# Patient Record
Sex: MALE | Race: WHITE | NOT HISPANIC OR LATINO
[De-identification: names, ages, dates, MRNs, and addresses within clinical notes are randomized per-mention and may not be internally consistent; named-entity substitution may affect disease eponyms.]

---

## 2018-11-29 PROBLEM — Z00.00 ENCOUNTER FOR PREVENTIVE HEALTH EXAMINATION: Status: ACTIVE | Noted: 2018-11-29

## 2018-12-11 ENCOUNTER — APPOINTMENT (OUTPATIENT)
Dept: CARDIOLOGY | Facility: CLINIC | Age: 58
End: 2018-12-11
Payer: COMMERCIAL

## 2018-12-11 PROCEDURE — 93880 EXTRACRANIAL BILAT STUDY: CPT

## 2018-12-11 PROCEDURE — 93306 TTE W/DOPPLER COMPLETE: CPT

## 2018-12-12 ENCOUNTER — APPOINTMENT (OUTPATIENT)
Dept: CARDIOLOGY | Facility: CLINIC | Age: 58
End: 2018-12-12
Payer: COMMERCIAL

## 2018-12-12 ENCOUNTER — NON-APPOINTMENT (OUTPATIENT)
Age: 58
End: 2018-12-12

## 2018-12-12 VITALS
SYSTOLIC BLOOD PRESSURE: 174 MMHG | WEIGHT: 185 LBS | HEART RATE: 101 BPM | BODY MASS INDEX: 26.48 KG/M2 | DIASTOLIC BLOOD PRESSURE: 80 MMHG | OXYGEN SATURATION: 98 % | HEIGHT: 70 IN

## 2018-12-12 DIAGNOSIS — Z78.9 OTHER SPECIFIED HEALTH STATUS: ICD-10-CM

## 2018-12-12 DIAGNOSIS — Z82.49 FAMILY HISTORY OF ISCHEMIC HEART DISEASE AND OTHER DISEASES OF THE CIRCULATORY SYSTEM: ICD-10-CM

## 2018-12-12 DIAGNOSIS — Z87.891 PERSONAL HISTORY OF NICOTINE DEPENDENCE: ICD-10-CM

## 2018-12-12 DIAGNOSIS — Z83.3 FAMILY HISTORY OF DIABETES MELLITUS: ICD-10-CM

## 2018-12-12 PROCEDURE — 93000 ELECTROCARDIOGRAM COMPLETE: CPT

## 2018-12-12 PROCEDURE — 99204 OFFICE O/P NEW MOD 45 MIN: CPT

## 2018-12-12 NOTE — PHYSICAL EXAM
[General Appearance - Well Developed] : well developed [Normal Appearance] : normal appearance [Well Groomed] : well groomed [General Appearance - Well Nourished] : well nourished [No Deformities] : no deformities [General Appearance - In No Acute Distress] : no acute distress [Normal Conjunctiva] : the conjunctiva exhibited no abnormalities [Eyelids - No Xanthelasma] : the eyelids demonstrated no xanthelasmas [Normal Oral Mucosa] : normal oral mucosa [No Oral Pallor] : no oral pallor [No Oral Cyanosis] : no oral cyanosis [Normal Jugular Venous A Waves Present] : normal jugular venous A waves present [Normal Jugular Venous V Waves Present] : normal jugular venous V waves present [No Jugular Venous Oates A Waves] : no jugular venous oates A waves [Respiration, Rhythm And Depth] : normal respiratory rhythm and effort [Exaggerated Use Of Accessory Muscles For Inspiration] : no accessory muscle use [Auscultation Breath Sounds / Voice Sounds] : lungs were clear to auscultation bilaterally [Heart Rate And Rhythm] : heart rate and rhythm were normal [Heart Sounds] : normal S1 and S2 [Murmurs] : no murmurs present [Abdomen Soft] : soft [Abdomen Tenderness] : non-tender [Abdomen Mass (___ Cm)] : no abdominal mass palpated [Abnormal Walk] : normal gait [Gait - Sufficient For Exercise Testing] : the gait was sufficient for exercise testing [Nail Clubbing] : no clubbing of the fingernails [Cyanosis, Localized] : no localized cyanosis [Petechial Hemorrhages (___cm)] : no petechial hemorrhages [Skin Color & Pigmentation] : normal skin color and pigmentation [] : no rash [No Venous Stasis] : no venous stasis [Skin Lesions] : no skin lesions [No Skin Ulcers] : no skin ulcer [No Xanthoma] : no  xanthoma was observed

## 2018-12-12 NOTE — HISTORY OF PRESENT ILLNESS
[FreeTextEntry1] : The patient is presenting here today for cardiac evaluation. The patient is 58-year-old gentleman with history of hypertension. Patient has a component of white coat syndrome. He gets very anxious coming to doctors office. He checks his blood pressure at home and it's usually well controlled. Patient was referred for echocardiogram and carotid ultrasound. Patient was found to have likely ASD on echocardiogram. Patient has a history of cardiac murmur. Patient denies any chest pains. Patient has mild dyspnea on exertion.

## 2018-12-12 NOTE — ASSESSMENT
[FreeTextEntry1] : Today's ECG was reviewed. It is abnormal with LVH and secondary ST and T-wave changes echocardiogram was reviewed. No evidence of LVH. There is evidence of left to right shunt . An esophageal echocardiogram will be done to evaluate this of ASD.\par Dyspnea on exertion. Hypertension. Since this will be done to rule out any obstructive CAD. Followup after further testing.

## 2018-12-15 ENCOUNTER — TRANSCRIPTION ENCOUNTER (OUTPATIENT)
Age: 58
End: 2018-12-15

## 2019-02-06 ENCOUNTER — OUTPATIENT (OUTPATIENT)
Dept: OUTPATIENT SERVICES | Facility: HOSPITAL | Age: 59
LOS: 1 days | End: 2019-02-06
Payer: COMMERCIAL

## 2019-02-06 PROCEDURE — 93320 DOPPLER ECHO COMPLETE: CPT | Mod: 26

## 2019-02-06 PROCEDURE — 93312 ECHO TRANSESOPHAGEAL: CPT | Mod: 26

## 2019-02-12 ENCOUNTER — APPOINTMENT (OUTPATIENT)
Dept: CARDIOLOGY | Facility: CLINIC | Age: 59
End: 2019-02-12

## 2019-02-13 ENCOUNTER — APPOINTMENT (OUTPATIENT)
Dept: CARDIOLOGY | Facility: CLINIC | Age: 59
End: 2019-02-13

## 2019-02-15 ENCOUNTER — APPOINTMENT (OUTPATIENT)
Dept: CARDIOLOGY | Facility: CLINIC | Age: 59
End: 2019-02-15
Payer: COMMERCIAL

## 2019-02-15 PROCEDURE — 99215 OFFICE O/P EST HI 40 MIN: CPT

## 2019-02-17 NOTE — ASSESSMENT
[FreeTextEntry1] : Atrial septal defect. Right-sided enlargement. \par Refer for percutaneous closure. \par Continue antiplatelet therapy. \par \par Mitral valve disease. \par Does not require SBE prophylaxis. \par \par Hypertension. Reactive component. \par Monitor blood pressure at home. \par Return with blood pressure monitor and log. \par \par Hyperlipdiemia\par pre DM\par Continue statin therapy. \par \par Clinical followup will be scheduled in one month.\par

## 2019-02-17 NOTE — HISTORY OF PRESENT ILLNESS
[FreeTextEntry1] : ZACK MICHAELS  is a 59 year old  M\par h/o ASD, HTN, HL, pre DM, MR\par \par Initially referred by Dr. Dixon for an echocardiogram after recently diagnosed with hypertension, hyperlipidemia. \par Started on antihypertensives and statin therapy.\par Echocardiogram demonstrated right-sided enlargement. \par He reports he was previously told of an enlarged heart prior to his hip surgery. \par \par There is no prior history of a clinical myocardial infarction, coronary revascularization. \par There is no history of symptomatic congestive heart failure rheumatic heart disease\par There is no history of symptomatic arrhythmias including atrial fibrillation.\par \par Physically active at baseline. Previously, running 5K. \par Less active after hip replacement. \par Walks actively at his job. \par There is no exertional chest pain, pressure or discomfort. \par There is no significant dyspnea on exertion or orthopnea. \par There are no symptomatic palpitations, lightheadedness, dizziness or syncope.\par \par November 2018. Hemoglobin 16.0, creatinine 1.0, potassium 4.2, total cholesterol 235, , hemoglobin A1c 5.7, glucose 139, TSH 1.3\par \par EKG demonstrates normal sinus rhythm with possible LVH. \par \par DIMAS demonstrated dilated right-sided chambers with the atrial septal defect and mitral valve disease. \par \par Scheduled to see Dr. Guzman next week

## 2019-02-17 NOTE — REASON FOR VISIT
[Hypertension] : hypertension [Follow-Up - Clinic] : a clinic follow-up of [Abnormal Test Result] : an abnormal test result

## 2019-02-19 ENCOUNTER — APPOINTMENT (OUTPATIENT)
Dept: CARDIOLOGY | Facility: CLINIC | Age: 59
End: 2019-02-19
Payer: COMMERCIAL

## 2019-02-19 VITALS
WEIGHT: 185 LBS | SYSTOLIC BLOOD PRESSURE: 160 MMHG | HEIGHT: 70 IN | DIASTOLIC BLOOD PRESSURE: 82 MMHG | OXYGEN SATURATION: 100 % | HEART RATE: 72 BPM | BODY MASS INDEX: 26.48 KG/M2

## 2019-02-19 PROCEDURE — 99205 OFFICE O/P NEW HI 60 MIN: CPT

## 2019-02-19 RX ORDER — LOSARTAN POTASSIUM AND HYDROCHLOROTHIAZIDE 50; 12.5 MG/1; MG/1
50-12.5 TABLET, FILM COATED ORAL DAILY
Refills: 0 | Status: DISCONTINUED | COMMUNITY
End: 2019-02-19

## 2019-02-19 NOTE — PHYSICAL EXAM
[General Appearance - Well Developed] : well developed [Normal Appearance] : normal appearance [Well Groomed] : well groomed [General Appearance - Well Nourished] : well nourished [No Deformities] : no deformities [General Appearance - In No Acute Distress] : no acute distress [Normal Conjunctiva] : the conjunctiva exhibited no abnormalities [Eyelids - No Xanthelasma] : the eyelids demonstrated no xanthelasmas [Normal Oral Mucosa] : normal oral mucosa [No Oral Pallor] : no oral pallor [No Oral Cyanosis] : no oral cyanosis [Heart Rate And Rhythm] : heart rate and rhythm were normal [Heart Sounds] : normal S1 and S2 [Systolic grade ___/6] : A grade [unfilled]/6 systolic murmur was heard. [Respiration, Rhythm And Depth] : normal respiratory rhythm and effort [Exaggerated Use Of Accessory Muscles For Inspiration] : no accessory muscle use [Auscultation Breath Sounds / Voice Sounds] : lungs were clear to auscultation bilaterally [Abdomen Soft] : soft [Abdomen Tenderness] : non-tender [Abdomen Mass (___ Cm)] : no abdominal mass palpated [Abnormal Walk] : normal gait [Gait - Sufficient For Exercise Testing] : the gait was sufficient for exercise testing [Nail Clubbing] : no clubbing of the fingernails [Cyanosis, Localized] : no localized cyanosis [Petechial Hemorrhages (___cm)] : no petechial hemorrhages [Skin Color & Pigmentation] : normal skin color and pigmentation [] : no rash [No Venous Stasis] : no venous stasis [Skin Lesions] : no skin lesions [No Skin Ulcers] : no skin ulcer [No Xanthoma] : no  xanthoma was observed

## 2019-02-26 NOTE — REASON FOR VISIT
[Consultation] : a consultation regarding [FreeTextEntry2] : evaluation for ASD closure [FreeTextEntry1] : enlarged heart, atrial septal defect

## 2019-02-26 NOTE — DISCUSSION/SUMMARY
[FreeTextEntry1] : DIMAS was reviewed.  This shows a moderate sized ostium secundum ASD with left to right shunting.  RV is enlarged.  There are adequate margins for percutaneous closure.  With RV enlargement, closure of ASD is indicated.  Procedure was explained to patient who wishes to proceed.

## 2019-02-26 NOTE — HISTORY OF PRESENT ILLNESS
[FreeTextEntry1] : 59 year old asymptomatic male who was previously told of an enlarged heart.  Recent echo followed by DIMAS found an ostium secundum ASD with RV enlargement.  Patient denies chest pain, dyspnea, palpitations.

## 2019-03-12 ENCOUNTER — OUTPATIENT (OUTPATIENT)
Dept: OUTPATIENT SERVICES | Facility: HOSPITAL | Age: 59
LOS: 1 days | End: 2019-03-12
Payer: COMMERCIAL

## 2019-03-12 VITALS
TEMPERATURE: 98 F | HEART RATE: 82 BPM | RESPIRATION RATE: 18 BRPM | SYSTOLIC BLOOD PRESSURE: 145 MMHG | OXYGEN SATURATION: 97 % | WEIGHT: 190.04 LBS | DIASTOLIC BLOOD PRESSURE: 78 MMHG | HEIGHT: 70 IN

## 2019-03-12 VITALS — HEIGHT: 70 IN | WEIGHT: 190.04 LBS

## 2019-03-12 DIAGNOSIS — Z01.810 ENCOUNTER FOR PREPROCEDURAL CARDIOVASCULAR EXAMINATION: ICD-10-CM

## 2019-03-12 DIAGNOSIS — Z96.641 PRESENCE OF RIGHT ARTIFICIAL HIP JOINT: Chronic | ICD-10-CM

## 2019-03-12 LAB
ANION GAP SERPL CALC-SCNC: 12 MMOL/L — SIGNIFICANT CHANGE UP (ref 5–17)
APTT BLD: 33 SEC — SIGNIFICANT CHANGE UP (ref 27.5–36.3)
BASOPHILS # BLD AUTO: 0 K/UL — SIGNIFICANT CHANGE UP (ref 0–0.2)
BASOPHILS NFR BLD AUTO: 0.5 % — SIGNIFICANT CHANGE UP (ref 0–2)
BLD GP AB SCN SERPL QL: SIGNIFICANT CHANGE UP
BUN SERPL-MCNC: 22 MG/DL — HIGH (ref 8–20)
CALCIUM SERPL-MCNC: 9.1 MG/DL — SIGNIFICANT CHANGE UP (ref 8.6–10.2)
CHLORIDE SERPL-SCNC: 102 MMOL/L — SIGNIFICANT CHANGE UP (ref 98–107)
CO2 SERPL-SCNC: 26 MMOL/L — SIGNIFICANT CHANGE UP (ref 22–29)
CREAT SERPL-MCNC: 1.01 MG/DL — SIGNIFICANT CHANGE UP (ref 0.5–1.3)
EOSINOPHIL # BLD AUTO: 0.1 K/UL — SIGNIFICANT CHANGE UP (ref 0–0.5)
EOSINOPHIL NFR BLD AUTO: 2.1 % — SIGNIFICANT CHANGE UP (ref 0–5)
GLUCOSE SERPL-MCNC: 96 MG/DL — SIGNIFICANT CHANGE UP (ref 70–115)
HCT VFR BLD CALC: 40.7 % — LOW (ref 42–52)
HGB BLD-MCNC: 13.5 G/DL — LOW (ref 14–18)
INR BLD: 0.97 RATIO — SIGNIFICANT CHANGE UP (ref 0.88–1.16)
LYMPHOCYTES # BLD AUTO: 1.7 K/UL — SIGNIFICANT CHANGE UP (ref 1–4.8)
LYMPHOCYTES # BLD AUTO: 29.9 % — SIGNIFICANT CHANGE UP (ref 20–55)
MAGNESIUM SERPL-MCNC: 2.3 MG/DL — SIGNIFICANT CHANGE UP (ref 1.6–2.6)
MCHC RBC-ENTMCNC: 31.3 PG — HIGH (ref 27–31)
MCHC RBC-ENTMCNC: 33.2 G/DL — SIGNIFICANT CHANGE UP (ref 32–36)
MCV RBC AUTO: 94.2 FL — HIGH (ref 80–94)
MONOCYTES # BLD AUTO: 0.5 K/UL — SIGNIFICANT CHANGE UP (ref 0–0.8)
MONOCYTES NFR BLD AUTO: 8.4 % — SIGNIFICANT CHANGE UP (ref 3–10)
NEUTROPHILS # BLD AUTO: 3.3 K/UL — SIGNIFICANT CHANGE UP (ref 1.8–8)
NEUTROPHILS NFR BLD AUTO: 58.9 % — SIGNIFICANT CHANGE UP (ref 37–73)
PLATELET # BLD AUTO: 281 K/UL — SIGNIFICANT CHANGE UP (ref 150–400)
POTASSIUM SERPL-MCNC: 3.7 MMOL/L — SIGNIFICANT CHANGE UP (ref 3.5–5.3)
POTASSIUM SERPL-SCNC: 3.7 MMOL/L — SIGNIFICANT CHANGE UP (ref 3.5–5.3)
PROTHROM AB SERPL-ACNC: 11.1 SEC — SIGNIFICANT CHANGE UP (ref 10–12.9)
RBC # BLD: 4.32 M/UL — LOW (ref 4.6–6.2)
RBC # FLD: 12.9 % — SIGNIFICANT CHANGE UP (ref 11–15.6)
SODIUM SERPL-SCNC: 140 MMOL/L — SIGNIFICANT CHANGE UP (ref 135–145)
TYPE + AB SCN PNL BLD: SIGNIFICANT CHANGE UP
WBC # BLD: 5.6 K/UL — SIGNIFICANT CHANGE UP (ref 4.8–10.8)
WBC # FLD AUTO: 5.6 K/UL — SIGNIFICANT CHANGE UP (ref 4.8–10.8)

## 2019-03-12 PROCEDURE — 86901 BLOOD TYPING SEROLOGIC RH(D): CPT

## 2019-03-12 PROCEDURE — 83735 ASSAY OF MAGNESIUM: CPT

## 2019-03-12 PROCEDURE — 80048 BASIC METABOLIC PNL TOTAL CA: CPT

## 2019-03-12 PROCEDURE — G0463: CPT

## 2019-03-12 PROCEDURE — 86850 RBC ANTIBODY SCREEN: CPT

## 2019-03-12 PROCEDURE — 85027 COMPLETE CBC AUTOMATED: CPT

## 2019-03-12 PROCEDURE — 86900 BLOOD TYPING SEROLOGIC ABO: CPT

## 2019-03-12 PROCEDURE — 93010 ELECTROCARDIOGRAM REPORT: CPT

## 2019-03-12 PROCEDURE — 85610 PROTHROMBIN TIME: CPT

## 2019-03-12 PROCEDURE — 36415 COLL VENOUS BLD VENIPUNCTURE: CPT

## 2019-03-12 PROCEDURE — 85730 THROMBOPLASTIN TIME PARTIAL: CPT

## 2019-03-12 PROCEDURE — 93005 ELECTROCARDIOGRAM TRACING: CPT

## 2019-03-12 NOTE — H&P PST ADULT - ASSESSMENT
58 yo male with ASD for ASD repair  -Proceed with procedure as planned  -NPO after 7:30 am day of procedure

## 2019-03-12 NOTE — H&P PST ADULT - HISTORY OF PRESENT ILLNESS
60 YO male with pmhx HTN, HLD, with atrial septal defect evaluation presents for PST for ASD closure. Recent DIMAS found an ostium secundum ASD with RV enlargement.  Denies chest pain, sob, palps.     DIMAS: Mitral valve prolapse, aortic atherosclerosis, mild to moderate regurgitation,  secundum ASD with left to right shunting, right atrial enlargement, right ventricular enlargement, normal left ventricular function.     TTE: Mild eccentric posteriorly directed mitral regurgitation. Mild aortic regurgitation. Right atrial enlargement. Normal Tricuspid valve. Mild TR. Trace to mild pulmonic regurgitation. Positive left to right color flow shunt seen across an aneurysmal interatrial septum c/w ASD.

## 2019-03-15 ENCOUNTER — INPATIENT (INPATIENT)
Facility: HOSPITAL | Age: 59
LOS: 0 days | Discharge: HOME | DRG: 274 | End: 2019-03-16
Attending: INTERNAL MEDICINE | Admitting: INTERNAL MEDICINE
Payer: COMMERCIAL

## 2019-03-15 ENCOUNTER — TRANSCRIPTION ENCOUNTER (OUTPATIENT)
Age: 59
End: 2019-03-15

## 2019-03-15 VITALS
TEMPERATURE: 98 F | SYSTOLIC BLOOD PRESSURE: 151 MMHG | RESPIRATION RATE: 18 BRPM | DIASTOLIC BLOOD PRESSURE: 80 MMHG | HEART RATE: 63 BPM | OXYGEN SATURATION: 99 %

## 2019-03-15 DIAGNOSIS — Q21.1 ATRIAL SEPTAL DEFECT: ICD-10-CM

## 2019-03-15 DIAGNOSIS — Z96.641 PRESENCE OF RIGHT ARTIFICIAL HIP JOINT: Chronic | ICD-10-CM

## 2019-03-15 DIAGNOSIS — Z01.810 ENCOUNTER FOR PREPROCEDURAL CARDIOVASCULAR EXAMINATION: ICD-10-CM

## 2019-03-15 LAB — ABO RH CONFIRMATION: SIGNIFICANT CHANGE UP

## 2019-03-15 PROCEDURE — 93662 INTRACARDIAC ECG (ICE): CPT | Mod: 26

## 2019-03-15 PROCEDURE — 93010 ELECTROCARDIOGRAM REPORT: CPT

## 2019-03-15 PROCEDURE — 93580 TRANSCATH CLOSURE OF ASD: CPT

## 2019-03-15 PROCEDURE — 99152 MOD SED SAME PHYS/QHP 5/>YRS: CPT

## 2019-03-15 RX ORDER — CLOPIDOGREL BISULFATE 75 MG/1
75 TABLET, FILM COATED ORAL DAILY
Qty: 0 | Refills: 0 | Status: DISCONTINUED | OUTPATIENT
Start: 2019-03-16 | End: 2019-03-16

## 2019-03-15 RX ORDER — ATORVASTATIN CALCIUM 80 MG/1
1 TABLET, FILM COATED ORAL
Qty: 0 | Refills: 0 | COMMUNITY

## 2019-03-15 RX ORDER — ACETAMINOPHEN 500 MG
650 TABLET ORAL EVERY 6 HOURS
Qty: 0 | Refills: 0 | Status: DISCONTINUED | OUTPATIENT
Start: 2019-03-15 | End: 2019-03-16

## 2019-03-15 RX ORDER — ASPIRIN/CALCIUM CARB/MAGNESIUM 324 MG
1 TABLET ORAL
Qty: 0 | Refills: 0 | COMMUNITY

## 2019-03-15 RX ORDER — SODIUM CHLORIDE 9 MG/ML
1000 INJECTION INTRAMUSCULAR; INTRAVENOUS; SUBCUTANEOUS
Qty: 0 | Refills: 0 | Status: DISCONTINUED | OUTPATIENT
Start: 2019-03-15 | End: 2019-03-16

## 2019-03-15 RX ORDER — LOSARTAN POTASSIUM 100 MG/1
100 TABLET, FILM COATED ORAL DAILY
Qty: 0 | Refills: 0 | Status: DISCONTINUED | OUTPATIENT
Start: 2019-03-16 | End: 2019-03-16

## 2019-03-15 RX ORDER — ONDANSETRON 8 MG/1
4 TABLET, FILM COATED ORAL EVERY 6 HOURS
Qty: 0 | Refills: 0 | Status: DISCONTINUED | OUTPATIENT
Start: 2019-03-15 | End: 2019-03-16

## 2019-03-15 RX ORDER — LOSARTAN POTASSIUM 100 MG/1
1 TABLET, FILM COATED ORAL
Qty: 0 | Refills: 0 | COMMUNITY

## 2019-03-15 RX ORDER — ATORVASTATIN CALCIUM 80 MG/1
10 TABLET, FILM COATED ORAL AT BEDTIME
Qty: 0 | Refills: 0 | Status: DISCONTINUED | OUTPATIENT
Start: 2019-03-15 | End: 2019-03-16

## 2019-03-15 RX ORDER — ASPIRIN/CALCIUM CARB/MAGNESIUM 324 MG
81 TABLET ORAL DAILY
Qty: 0 | Refills: 0 | Status: DISCONTINUED | OUTPATIENT
Start: 2019-03-16 | End: 2019-03-16

## 2019-03-15 RX ADMIN — ATORVASTATIN CALCIUM 10 MILLIGRAM(S): 80 TABLET, FILM COATED ORAL at 22:09

## 2019-03-15 NOTE — PROGRESS NOTE ADULT - SUBJECTIVE AND OBJECTIVE BOX
Interventional Cardiology NP Precath Note      HPI: 58 YO male with pmhx HTN, HLD, with atrial septal defect evaluation presents for ASD closure. Recent DIMAS found an ostium secundum ASD with RV enlargement.  Denies chest pain, sob, palps. No change since PST.      ALL: NKDA, NKFA. Denies shellfish/Contrast dye allergy.  PAST MEDICAL & SURGICAL HISTORY:  HLD (hyperlipidemia)  HTN (hypertension)  History of right hip replacement    SocHX: Denies EtoH/TOB/IVDU    MEDS:   sodium chloride 0.9%. 1000 milliLiter(s) IV Continuous <Continuous>    T(C): 36.5 (03-15-19 @ 10:13), Max: 36.5 (03-15-19 @ 10:13)  HR: 63 (03-15-19 @ 10:13) (63 - 63)  BP: 151/80 (03-15-19 @ 10:13) (151/80 - 151/80)  RR: 18 (03-15-19 @ 10:13) (18 - 18)  SpO2: 99% (03-15-19 @ 10:13) (99% - 99%)  Wt(kg): --        				  ASA __2___				Mallampati class: ______2___	  A/P:  ASD repair as planned        Risks & benefits of procedure and sedation and risks and benefits for the alternative therapy have been explained to the patient in layman’s terms including but not limited to: allergic reaction, bleeding, infection, arrhythmia, respiratory compromise, renal and vascular compromise, limb damage, MI, CVA, emergent CABG/Vascular Surgery and death. Informed consent obtained and in chart by MD.

## 2019-03-15 NOTE — PROGRESS NOTE ADULT - SUBJECTIVE AND OBJECTIVE BOX
Removal of Femoral Sheath - post cath/ ASD closure with Dr. Guzman    Pulses in the right lower extremity are palpable.  The patient was placed in the supine position. The insertion site was identified and the sutures were removed per protocol.  The 2 femoral sheaths (#7&&RFV's)  were then removed.   Direct pressure was applied for 20 minutes.     Monitoring of the right groin and both lower extremities including neuro-vascular checks and vital signs every 15 minutes x 4, then every 30 minutes x 2, then every 1 hour was ordered.    Complications: None    Comments:  Vital stable   post sheath pull site is soft, non tender  no hematoma or bleeding noted +DP  activity restrictions and reportable symptoms discussed w patient.

## 2019-03-15 NOTE — DISCHARGE NOTE PROVIDER - CARE PROVIDER_API CALL
Olvin Guzman)  Cardiology; Internal Medicine; Interventional Cardiology  [address] [place] [address]  Phone: [phone]  Fax: [phone]  Follow Up Time:

## 2019-03-15 NOTE — DISCHARGE NOTE PROVIDER - NSDCCPCAREPLAN_GEN_ALL_CORE_FT
PRINCIPAL DISCHARGE DIAGNOSIS  Diagnosis: S/P surgical atrial septal defect closure  Assessment and Plan of Treatment: PRINCIPAL DISCHARGE DIAGNOSIS  Diagnosis: S/P surgical atrial septal defect closure  Assessment and Plan of Treatment: -Continue your home medications daily including your aspirin (for 6 months)  -Plavix initiated po daily 3 months  -Echo to be done in 1 month and then again in 6 months  -right groin precautions  -continue heart healthy diet  -follow up with Dr. Guzman outpt.

## 2019-03-15 NOTE — DISCHARGE NOTE PROVIDER - HOSPITAL COURSE
58 Y/O male with PMzHTN, HLD, with atrial septal defect evaluation presents for ASD closure. Recent DIMAS found an ostium secundum ASD with RV enlargement.    Patient was admitted to the hospital and underwent repair which was uneventful. Echo done but report is pending at this time        Procedure note    Local anesthetic given. Right femoral vein access. Right femoral vein    access. Right heart catheterization. The procedure was performed utilizing    a 7FR SWAN DEVANTE catheter. For proper positioning, 5FR MPA IMPULSE    catheter(s) were also used. RADIATION EXPOSURE: 10.4 min. Percutaneous    device closure of an atrial septal defect was performed under fluoroscopic    guidance. Placement was monitored by intracardiac echocardiography using a    8F ACUNAV ULTRASOUND probe in the right atrium. The defect size was    measured to be 15 mm by quantitative angiography of a sizing balloon    inflated within the defect. The long sheath was then advanced over the    wire distal to the defect, and the wire was removed. A(n) 18MM AMPLATZER    SEPTAL OCCLUDER closure device was advanced across the defect through the    sheath, and deployed to close the defect. The final deployed position was    satisfactory. There was a trivial residual shunt by color-flow imaging.    Intracardiac Echocardiogram. ASD Closure.    An IABP was not used. No mechanical ventricular support was required.        HEMODYNAMICS: A definite small left to right shunt was identified at the    atrial level.    COMPLICATIONS: No complications occurred during the cath lab visit.    DIAGNOSTIC IMPRESSIONS: Intracardiac echo confirmed an ostium secundum ASD    with adequate margins for closure.    INTERVENTIONAL RECOMMENDATIONS: Echo prior to discharge, in 1 month and 6    months    ASA and Plavix for 3 months thsn ASA for 3 months    SBE prophylaxis for 6 months  discussed with patient

## 2019-03-15 NOTE — PROGRESS NOTE ADULT - SUBJECTIVE AND OBJECTIVE BOX
This is a 60 y/o white M with PMHx HTN,HLD, that presented with ostium secundum  and RV enlargement s/p DIMAS; pt is now s/p ASD closure via right groin approach with Dr. Guzman.  PT currently denies chest pain, SOB, palps, numbness/tingling to RLE    Neuro: A&Ox4, neurologically intact, ROM intact, denies pain, afebrile  Pulm: CTAB  Cardiac: sinus rhythm 65 bpm, S1, S2  Vascular: right groin + FA,FV sheaths, palp femoral pulses and pulses palpable +2 distally.    -admit to tele ONU  -ECG  -labs and ECG in am  -post cardiac cath orders  -right groin site precautions  -cont. home medications	  -sheath removal [id number] This is a 58 y/o white M with PMHx HTN,HLD, that presented with ostium secundum  and RV enlargement s/p DIMAS; pt is now s/p ASD closure via right groin approach with Dr. Guzman.  PT currently denies chest pain, SOB, palps, numbness/tingling to RLE    Neuro: A&Ox4, neurologically intact, ROM intact, denies pain, afebrile  Pulm: CTAB  Cardiac: sinus rhythm 65 bpm, S1, S2  Vascular: right groin + FA,FV sheaths, palp femoral pulses and pulses palpable +2 distally.    -admit to tele ONU  -ECG  -echo today  -labs and ECG in am  -post cardiac cath orders  -right groin site precautions  -cont. home medications	  -sheath removal [id number] This is a 58 y/o white M with PMHx HTN,HLD, that presented with ostium secundum  and RV enlargement s/p DIMAS; pt is now s/p ASD closure via right groin approach with Dr. Guzman.  PT currently denies chest pain, SOB, palps, numbness/tingling to RLE    Neuro: A&Ox4, neurologically intact, ROM intact, denies pain, afebrile  Pulm: CTAB  Cardiac: sinus rhythm 65 bpm, S1, S2  Vascular: right groin + FA,FV sheaths, palp femoral pulses and pulses palpable +2 distally.    -admit to tele ONU  -ECG  -echo today and repeat in 1 month and 6 months  -plavix daily x 3 months  -asa x 6 months  -f/u w Dr. Guzman out pt  -labs and ECG in am  -post cardiac cath orders  -right groin site precautions  -cont. home medications	  -sheath removal [id number]

## 2019-03-16 ENCOUNTER — TRANSCRIPTION ENCOUNTER (OUTPATIENT)
Age: 59
End: 2019-03-16

## 2019-03-16 VITALS
SYSTOLIC BLOOD PRESSURE: 129 MMHG | DIASTOLIC BLOOD PRESSURE: 78 MMHG | OXYGEN SATURATION: 98 % | RESPIRATION RATE: 18 BRPM | HEART RATE: 75 BPM

## 2019-03-16 LAB
ANION GAP SERPL CALC-SCNC: 12 MMOL/L — SIGNIFICANT CHANGE UP (ref 5–17)
BUN SERPL-MCNC: 25 MG/DL — HIGH (ref 8–20)
CALCIUM SERPL-MCNC: 8.9 MG/DL — SIGNIFICANT CHANGE UP (ref 8.6–10.2)
CHLORIDE SERPL-SCNC: 104 MMOL/L — SIGNIFICANT CHANGE UP (ref 98–107)
CO2 SERPL-SCNC: 25 MMOL/L — SIGNIFICANT CHANGE UP (ref 22–29)
CREAT SERPL-MCNC: 0.96 MG/DL — SIGNIFICANT CHANGE UP (ref 0.5–1.3)
GLUCOSE SERPL-MCNC: 179 MG/DL — HIGH (ref 70–115)
HCT VFR BLD CALC: 42.5 % — SIGNIFICANT CHANGE UP (ref 42–52)
HGB BLD-MCNC: 14 G/DL — SIGNIFICANT CHANGE UP (ref 14–18)
MCHC RBC-ENTMCNC: 31.3 PG — HIGH (ref 27–31)
MCHC RBC-ENTMCNC: 32.9 G/DL — SIGNIFICANT CHANGE UP (ref 32–36)
MCV RBC AUTO: 95.1 FL — HIGH (ref 80–94)
PLATELET # BLD AUTO: 258 K/UL — SIGNIFICANT CHANGE UP (ref 150–400)
POTASSIUM SERPL-MCNC: 3.7 MMOL/L — SIGNIFICANT CHANGE UP (ref 3.5–5.3)
POTASSIUM SERPL-SCNC: 3.7 MMOL/L — SIGNIFICANT CHANGE UP (ref 3.5–5.3)
RBC # BLD: 4.47 M/UL — LOW (ref 4.6–6.2)
RBC # FLD: 13.1 % — SIGNIFICANT CHANGE UP (ref 11–15.6)
SODIUM SERPL-SCNC: 141 MMOL/L — SIGNIFICANT CHANGE UP (ref 135–145)
WBC # BLD: 7 K/UL — SIGNIFICANT CHANGE UP (ref 4.8–10.8)
WBC # FLD AUTO: 7 K/UL — SIGNIFICANT CHANGE UP (ref 4.8–10.8)

## 2019-03-16 PROCEDURE — 93580 TRANSCATH CLOSURE OF ASD: CPT

## 2019-03-16 PROCEDURE — C1889: CPT

## 2019-03-16 PROCEDURE — 85027 COMPLETE CBC AUTOMATED: CPT

## 2019-03-16 PROCEDURE — C1887: CPT

## 2019-03-16 PROCEDURE — 93662 INTRACARDIAC ECG (ICE): CPT

## 2019-03-16 PROCEDURE — 93306 TTE W/DOPPLER COMPLETE: CPT

## 2019-03-16 PROCEDURE — 99152 MOD SED SAME PHYS/QHP 5/>YRS: CPT

## 2019-03-16 PROCEDURE — 36415 COLL VENOUS BLD VENIPUNCTURE: CPT

## 2019-03-16 PROCEDURE — 80048 BASIC METABOLIC PNL TOTAL CA: CPT

## 2019-03-16 PROCEDURE — C1759: CPT

## 2019-03-16 PROCEDURE — C1769: CPT

## 2019-03-16 PROCEDURE — 99153 MOD SED SAME PHYS/QHP EA: CPT

## 2019-03-16 PROCEDURE — C1894: CPT

## 2019-03-16 PROCEDURE — 93005 ELECTROCARDIOGRAM TRACING: CPT

## 2019-03-16 PROCEDURE — C1817: CPT

## 2019-03-16 RX ORDER — CLOPIDOGREL BISULFATE 75 MG/1
1 TABLET, FILM COATED ORAL
Qty: 30 | Refills: 2 | OUTPATIENT
Start: 2019-03-16 | End: 2019-06-13

## 2019-03-16 RX ORDER — POTASSIUM CHLORIDE 20 MEQ
40 PACKET (EA) ORAL ONCE
Qty: 0 | Refills: 0 | Status: COMPLETED | OUTPATIENT
Start: 2019-03-16 | End: 2019-03-16

## 2019-03-16 RX ADMIN — Medication 40 MILLIEQUIVALENT(S): at 12:07

## 2019-03-16 RX ADMIN — CLOPIDOGREL BISULFATE 75 MILLIGRAM(S): 75 TABLET, FILM COATED ORAL at 12:07

## 2019-03-16 RX ADMIN — LOSARTAN POTASSIUM 100 MILLIGRAM(S): 100 TABLET, FILM COATED ORAL at 06:36

## 2019-03-16 RX ADMIN — Medication 81 MILLIGRAM(S): at 12:07

## 2019-03-16 NOTE — PROGRESS NOTE ADULT - SUBJECTIVE AND OBJECTIVE BOX
Feeling well anxious to go home  Had a quiet night  CM sinus rhythmn  one episode sinus tach  Echo done awaiting results  No cp sob or palp  right groin dressing removed no hematoma + pulse    Lungs clear  Heart s1&s2 regular  Abd soft non tender  Ext no c/c/e  right groind  Neuro alert oriented    60 y/o male with ASD for ASD repair    Doing well  if labs ok and echo ok will d/c today Feeling well anxious to go home  Had a quiet night  CM sinus rhythmn  one episode sinus tach  Echo done awaiting results  No cp sob or palp  right groin dressing removed no hematoma + pulse    Lungs clear  Heart s1&s2 regular  Abd soft non tender  Ext no c/c/e  right groind  Neuro alert oriented    58 y/o male with ASD for ASD repair    Doing well  if labs ok and echo ok will d/c today  discussed prophylaxis and importance of taking asa and Plavix daily  discussed with DR Guzman ok to d/c prior to echo being read

## 2019-03-16 NOTE — CHART NOTE - NSCHARTNOTEFT_GEN_A_CORE
To whom this may concern:    Mr Miguel Sales may return to work on March 21, 2019    Please call if you have any questions.    Donna ShresthaNP

## 2019-03-16 NOTE — DISCHARGE NOTE NURSING/CASE MANAGEMENT/SOCIAL WORK - NSDCDPATPORTLINK_GEN_ALL_CORE
You can access the [facility] Patient Portal, offered by [facility], by registering with the following website: [email or web address]

## 2019-03-22 ENCOUNTER — NON-APPOINTMENT (OUTPATIENT)
Age: 59
End: 2019-03-22

## 2019-03-22 ENCOUNTER — APPOINTMENT (OUTPATIENT)
Dept: CARDIOLOGY | Facility: CLINIC | Age: 59
End: 2019-03-22
Payer: COMMERCIAL

## 2019-03-22 VITALS
BODY MASS INDEX: 26.48 KG/M2 | SYSTOLIC BLOOD PRESSURE: 130 MMHG | WEIGHT: 185 LBS | DIASTOLIC BLOOD PRESSURE: 82 MMHG | HEART RATE: 74 BPM | HEIGHT: 70 IN

## 2019-03-22 PROBLEM — E78.5 HYPERLIPIDEMIA, UNSPECIFIED: Chronic | Status: ACTIVE | Noted: 2019-03-12

## 2019-03-22 PROBLEM — I10 ESSENTIAL (PRIMARY) HYPERTENSION: Chronic | Status: ACTIVE | Noted: 2019-03-12

## 2019-03-22 PROCEDURE — 99214 OFFICE O/P EST MOD 30 MIN: CPT

## 2019-03-22 PROCEDURE — 93000 ELECTROCARDIOGRAM COMPLETE: CPT

## 2019-03-22 NOTE — REASON FOR VISIT
[Follow-Up - Clinic] : a clinic follow-up of [Abnormal Test Result] : an abnormal test result [Hypertension] : hypertension

## 2019-03-25 NOTE — HISTORY OF PRESENT ILLNESS
[FreeTextEntry1] : ZACK MICHAELS  is a 59 year old  M\par h/o ASD, HTN, HL, pre DM, MR\par \par Initially referred by Dr. Dixon for an echocardiogram after recently diagnosed with hypertension, hyperlipidemia. \par Started on antihypertensives and statin therapy.\par Echocardiogram demonstrated right-sided enlargement. \par He reports he was previously told of an enlarged heart prior to his hip surgery. \par \par There is no prior history of a clinical myocardial infarction, coronary revascularization. \par There is no history of symptomatic congestive heart failure rheumatic heart disease\par There is no history of symptomatic arrhythmias including atrial fibrillation.\par \par Physically active at baseline. Previously, running 5K. \par Less active after hip replacement. \par Walks actively at his job. \par There is no exertional chest pain, pressure or discomfort. \par There is no significant dyspnea on exertion or orthopnea. \par There are no symptomatic palpitations, lightheadedness, dizziness or syncope.\par \par Intracardiac echo demonstrated an ostium secundum ASD with adequate margins for closure PA \par Right atrium and right ventricle pressures were normal.\par Percutaneous device closure was performed under fluoroscopic guidance with amplatz septal occluder device. \par Reports that he required potassium supplementation.\par Observed overnight\par Post procedure echocardiogram was performed March 2019. Normal left ventricular function, mild RV enlargement\par Discharge medications include aspirin, Plavix, Lipitor, losartan\par Blood work, hemoglobin 14.0, creatinine 1.0\par \par EKG demonstrates normal sinus rhythm with IVCD\par \par November 2018. Hemoglobin 16.0, creatinine 1.0, potassium 4.2, total cholesterol 235, , hemoglobin A1c 5.7, glucose 139, TSH 1.3\par \par EKG demonstrates normal sinus rhythm with possible LVH. \par \par DIMAS demonstrated dilated right-sided chambers with the atrial septal defect and mitral valve disease. \par

## 2019-03-25 NOTE — ASSESSMENT
[FreeTextEntry1] : Atrial septal defect. Right-sided enlargement. \par s/p percutaneous closure. \par Followup echocardiogram was recommended in one month and 6 months\par Aspirin and Plavix are recommended for 3 months then aspirin for 3+ months\par SBE prophylaxis is recommended for 6 months\par Defer elective procedure.\par \par Mitral valve disease. \par Does not require SBE prophylaxis. \par \par Hypertension. Reactive component. \par Monitor blood pressure at home. \par Basic metabolic has been requested. \par Likely will require reduced antihypertensives long-term. \par \par Hyperlipdiemia\par pre DM\par Continue statin therapy. \par \par Clinical followup will be scheduled in one month.

## 2019-04-23 ENCOUNTER — APPOINTMENT (OUTPATIENT)
Dept: CARDIOLOGY | Facility: CLINIC | Age: 59
End: 2019-04-23
Payer: COMMERCIAL

## 2019-04-23 PROCEDURE — 93308 TTE F-UP OR LMTD: CPT

## 2019-05-03 ENCOUNTER — APPOINTMENT (OUTPATIENT)
Dept: CARDIOLOGY | Facility: CLINIC | Age: 59
End: 2019-05-03
Payer: COMMERCIAL

## 2019-05-03 VITALS
WEIGHT: 185 LBS | SYSTOLIC BLOOD PRESSURE: 114 MMHG | DIASTOLIC BLOOD PRESSURE: 60 MMHG | OXYGEN SATURATION: 97 % | BODY MASS INDEX: 26.54 KG/M2 | HEART RATE: 62 BPM

## 2019-05-03 DIAGNOSIS — I51.7 CARDIOMEGALY: ICD-10-CM

## 2019-05-03 PROCEDURE — 99214 OFFICE O/P EST MOD 30 MIN: CPT

## 2019-05-03 NOTE — PHYSICAL EXAM
[Normal Appearance] : normal appearance [General Appearance - Well Developed] : well developed [Well Groomed] : well groomed [General Appearance - In No Acute Distress] : no acute distress [General Appearance - Well Nourished] : well nourished [No Deformities] : no deformities [Normal Conjunctiva] : the conjunctiva exhibited no abnormalities [Eyelids - No Xanthelasma] : the eyelids demonstrated no xanthelasmas [Normal Oral Mucosa] : normal oral mucosa [No Oral Cyanosis] : no oral cyanosis [No Oral Pallor] : no oral pallor [Normal Jugular Venous V Waves Present] : normal jugular venous V waves present [Normal Jugular Venous A Waves Present] : normal jugular venous A waves present [No Jugular Venous Oates A Waves] : no jugular venous oates A waves [Respiration, Rhythm And Depth] : normal respiratory rhythm and effort [Exaggerated Use Of Accessory Muscles For Inspiration] : no accessory muscle use [Auscultation Breath Sounds / Voice Sounds] : lungs were clear to auscultation bilaterally [Heart Rate And Rhythm] : heart rate and rhythm were normal [Heart Sounds] : normal S1 and S2 [Murmurs] : no murmurs present [Abdomen Soft] : soft [Abdomen Tenderness] : non-tender [Abdomen Mass (___ Cm)] : no abdominal mass palpated [Abnormal Walk] : normal gait [Gait - Sufficient For Exercise Testing] : the gait was sufficient for exercise testing [Nail Clubbing] : no clubbing of the fingernails [Cyanosis, Localized] : no localized cyanosis [Petechial Hemorrhages (___cm)] : no petechial hemorrhages [Skin Color & Pigmentation] : normal skin color and pigmentation [] : no rash [No Venous Stasis] : no venous stasis [Skin Lesions] : no skin lesions [No Skin Ulcers] : no skin ulcer [No Xanthoma] : no  xanthoma was observed

## 2019-05-07 PROBLEM — I51.7 RIGHT VENTRICULAR ENLARGEMENT: Status: ACTIVE | Noted: 2019-02-17

## 2019-05-07 NOTE — ADDENDUM
[FreeTextEntry1] : Please note the patient was reviewed with HUE Hendrix.\par I was physically present during the service of the patient\par I was directly involved in the management plan and recommendations of care provided to the patient. \par I personally reviewed the history and physical exam and examination as documented by the PA above.\par 05/03/2019\par

## 2019-05-07 NOTE — ASSESSMENT
[FreeTextEntry1] : Atrial septal defect. Right-sided enlargement. \par s/p percutaneous closure. \par Followup echocardiogram was recommended, reveals small persistent color flow across IAS.\par He will have subsequent follow up with Dr. Guzman.\par Aspirin and Plavix are recommended for 3 months then aspirin for 3+ months\par SBE prophylaxis is recommended for 6 months from time of procedure. \par Defer elective procedure.\par \par Mitral valve disease. \par Does not require SBE prophylaxis. \par \par Hypertension. Reactive component. \par Monitor blood pressure at home. \par Basic metabolic WNL.\par Likely will require reduced antihypertensives long-term. \par \par Hyperlipdiemia\par pre DM\par Continue statin therapy. \par \par Clinical followup will be scheduled in 2 months.

## 2019-05-07 NOTE — HISTORY OF PRESENT ILLNESS
[FreeTextEntry1] : ZACK MICHAELS  is a 59 year old  M\par h/o ASD, HTN, HL, pre DM, MR\par \par Initially referred by Dr. Dixon for an echocardiogram after recently diagnosed with hypertension, hyperlipidemia. \par Started on antihypertensives and statin therapy.\par Echocardiogram demonstrated right-sided enlargement. \par He reports he was previously told of an enlarged heart prior to his hip surgery. \par \par There is no prior history of a clinical myocardial infarction, coronary revascularization. \par There is no history of symptomatic congestive heart failure rheumatic heart disease\par There is no history of symptomatic arrhythmias including atrial fibrillation.\par \par Physically active at baseline. Previously, running 5K. \par Less active after hip replacement. \par Walks actively at his job. \par There is no exertional chest pain, pressure or discomfort. \par There is no significant dyspnea on exertion or orthopnea. \par There are no symptomatic palpitations, lightheadedness, dizziness or syncope.\par \par Intracardiac echo demonstrated an ostium secundum ASD with adequate margins for closure PA \par Right atrium and right ventricle pressures were normal.\par Percutaneous device closure was performed under fluoroscopic guidance with amplatz septal occluder device. \par Reports that he required potassium supplementation.\par Observed overnight\par Post procedure echocardiogram was performed March 2019. Normal left ventricular function, mild RV enlargement. He was to remain on ASA, and Plavix post operatively.\par \par He had repeat echocardiogram on April 23, 2019 revealed EF of 60%. There was normal left ventricular systolic function normal wall motion. Mild to moderate eccentric MR. There was RV enlargement with normal right ventricular systolic function. There was echodensity noted at the intra-atrial septum consistent with closure device. There was a small left to right color-flow study shunt still seen crossing the IAS.\par \par \par At present he is asymptomatic.\par \par Labs 4-19 revealed total cholesterol 156, HDL 50, LDL 95, triglycerides 57.\par CMP was within normal limits, CBC within normal limits.\par \par Prior testing:\par EKG demonstrates normal sinus rhythm with IVCD\par \par November 2018. Hemoglobin 16.0, creatinine 1.0, potassium 4.2, total cholesterol 235, , hemoglobin A1c 5.7, glucose 139, TSH 1.3\par \par EKG demonstrates normal sinus rhythm with possible LVH. \par \par DIMAS demonstrated dilated right-sided chambers with the atrial septal defect and mitral valve disease. \par

## 2019-06-14 ENCOUNTER — MEDICATION RENEWAL (OUTPATIENT)
Age: 59
End: 2019-06-14

## 2019-07-19 ENCOUNTER — APPOINTMENT (OUTPATIENT)
Dept: CARDIOLOGY | Facility: CLINIC | Age: 59
End: 2019-07-19
Payer: COMMERCIAL

## 2019-07-19 VITALS
HEIGHT: 70 IN | HEART RATE: 81 BPM | DIASTOLIC BLOOD PRESSURE: 80 MMHG | SYSTOLIC BLOOD PRESSURE: 140 MMHG | BODY MASS INDEX: 27.2 KG/M2 | WEIGHT: 190 LBS

## 2019-07-19 PROCEDURE — 99214 OFFICE O/P EST MOD 30 MIN: CPT

## 2019-07-19 RX ORDER — CLOPIDOGREL 75 MG/1
75 TABLET, FILM COATED ORAL
Refills: 0 | Status: DISCONTINUED | COMMUNITY
End: 2019-07-19

## 2019-07-22 NOTE — ASSESSMENT
[FreeTextEntry1] : Atrial septal defect. Right-sided enlargement. \par s/p percutaneous closure. \par Followup echocardiogram has been recommended to monitor ASD closure, pulmonary pressures and right-sided structures \par Defer elective dental work\par Continue aspirin therapy. \par \par Mitral valve disease. \par Monitor valvular heart disease\par \par Hypertension. Reactive component. \par Monitor blood pressure at home. \par \par Hyperlipdiemia\par pre DM\par Continue statin therapy.

## 2019-07-22 NOTE — HISTORY OF PRESENT ILLNESS
[FreeTextEntry1] : ZACK MICHAELS  is a 59 year old  M\par \par h/o ASD, HTN, HL, pre DM, MR\par \par Initially referred by Dr. Dixon for an echocardiogram after recently diagnosed with hypertension, hyperlipidemia. \par Started on antihypertensives and statin therapy.\par Echocardiogram demonstrated right-sided enlargement. \par He reports he was previously told of an enlarged heart prior to his hip surgery. \par \par There is no prior history of a clinical myocardial infarction, coronary revascularization. \par There is no history of symptomatic congestive heart failure rheumatic heart disease\par There is no history of symptomatic arrhythmias including atrial fibrillation.\par \par Physically active at baseline. Previously, running 5K. \par Less active after hip replacement. \par Walks actively at his job. \par There is no exertional chest pain, pressure or discomfort. \par There is no significant dyspnea on exertion or orthopnea. \par There are no symptomatic palpitations, lightheadedness, dizziness or syncope.\par \par Intracardiac echo demonstrated an ostium secundum ASD with adequate margins for closure PA \par Right atrium and right ventricle pressures were normal.\par Percutaneous device closure was performed under fluoroscopic guidance with amplatz septal occluder device. \par Post procedure echocardiogram was performed March 2019. Normal left ventricular function, mild RV enlargement\par \par April 2019. Total cholesterol 156, HDL 50, LDL 95, creatinine 1.0, potassium 4.0, hemoglobin 13.1. \par Echocardiogram, April 2019. Normal left ventricular function. Mild to moderate mitral regurgitation. Mild aortic regurgitation. \par \par EKG demonstrates normal sinus rhythm with IVCD\par \par DIMAS demonstrated dilated right-sided chambers with the atrial septal defect and mitral valve disease.

## 2019-08-02 ENCOUNTER — APPOINTMENT (OUTPATIENT)
Dept: CARDIOLOGY | Facility: CLINIC | Age: 59
End: 2019-08-02
Payer: COMMERCIAL

## 2019-08-02 PROCEDURE — 93308 TTE F-UP OR LMTD: CPT

## 2019-08-09 ENCOUNTER — APPOINTMENT (OUTPATIENT)
Dept: CARDIOLOGY | Facility: CLINIC | Age: 59
End: 2019-08-09
Payer: COMMERCIAL

## 2019-08-09 VITALS
HEART RATE: 86 BPM | SYSTOLIC BLOOD PRESSURE: 140 MMHG | WEIGHT: 190 LBS | DIASTOLIC BLOOD PRESSURE: 70 MMHG | OXYGEN SATURATION: 98 % | HEIGHT: 70 IN | BODY MASS INDEX: 27.2 KG/M2

## 2019-08-09 PROCEDURE — 99214 OFFICE O/P EST MOD 30 MIN: CPT

## 2019-08-09 RX ORDER — ASPIRIN ENTERIC COATED TABLETS 81 MG 81 MG/1
81 TABLET, DELAYED RELEASE ORAL DAILY
Refills: 0 | Status: ACTIVE | COMMUNITY

## 2019-08-09 NOTE — PHYSICAL EXAM
[General Appearance - Well Developed] : well developed [Normal Appearance] : normal appearance [Well Groomed] : well groomed [General Appearance - Well Nourished] : well nourished [No Deformities] : no deformities [Normal Conjunctiva] : the conjunctiva exhibited no abnormalities [General Appearance - In No Acute Distress] : no acute distress [Eyelids - No Xanthelasma] : the eyelids demonstrated no xanthelasmas [Normal Oral Mucosa] : normal oral mucosa [No Oral Cyanosis] : no oral cyanosis [No Oral Pallor] : no oral pallor [Normal Jugular Venous A Waves Present] : normal jugular venous A waves present [No Jugular Venous Oates A Waves] : no jugular venous oates A waves [Normal Jugular Venous V Waves Present] : normal jugular venous V waves present [Respiration, Rhythm And Depth] : normal respiratory rhythm and effort [Exaggerated Use Of Accessory Muscles For Inspiration] : no accessory muscle use [Auscultation Breath Sounds / Voice Sounds] : lungs were clear to auscultation bilaterally [Heart Rate And Rhythm] : heart rate and rhythm were normal [Murmurs] : no murmurs present [Heart Sounds] : normal S1 and S2 [Abdomen Soft] : soft [Abdomen Tenderness] : non-tender [Abnormal Walk] : normal gait [Abdomen Mass (___ Cm)] : no abdominal mass palpated [Gait - Sufficient For Exercise Testing] : the gait was sufficient for exercise testing [Cyanosis, Localized] : no localized cyanosis [Nail Clubbing] : no clubbing of the fingernails [Petechial Hemorrhages (___cm)] : no petechial hemorrhages [] : no rash [Skin Color & Pigmentation] : normal skin color and pigmentation [Skin Lesions] : no skin lesions [No Venous Stasis] : no venous stasis [No Skin Ulcers] : no skin ulcer [No Xanthoma] : no  xanthoma was observed

## 2019-08-10 NOTE — HISTORY OF PRESENT ILLNESS
[FreeTextEntry1] : ZACK MICHAELS  is a 59 year old  M\par \par \par \par h/o ASD, HTN, HL, pre DM, MR\par \par Initially referred by Dr. Dixon for an echocardiogram after recently diagnosed with hypertension, hyperlipidemia. \par Started on antihypertensives and statin therapy.\par Echocardiogram demonstrated right-sided enlargement. \par He reports he was previously told of an enlarged heart prior to his hip surgery. \par \par There is no prior history of a clinical myocardial infarction, coronary revascularization. \par There is no history of symptomatic congestive heart failure rheumatic heart disease\par There is no history of symptomatic arrhythmias including atrial fibrillation.\par \par Physically active at baseline. Previously, running 5K. \par Less active after hip replacement. \par Walks actively at his job. \par There is no exertional chest pain, pressure or discomfort. \par There is no significant dyspnea on exertion or orthopnea. \par There are no symptomatic palpitations, lightheadedness, dizziness or syncope.\par \par Echo demonstrates normal left ventricular function there is normal right atrium there is right ventricular enlargement with normal right ventricular function. \par There is possible minor cold flow along the intra-atrial septum. \par BP on my exam 116/\par \par Intracardiac echo demonstrated an ostium secundum ASD with adequate margins for closure\par Right atrium and right ventricle pressures were normal.\par Percutaneous device closure was performed under fluoroscopic guidance with amplatz septal occluder device. \par Post procedure echocardiogram was performed March 2019. Normal left ventricular function, mild RV enlargement\par \par April 2019. Total cholesterol 156, HDL 50, LDL 95, creatinine 1.0, potassium 4.0, hemoglobin 13.1. \par Echocardiogram, April 2019. Normal left ventricular function. Mild to moderate mitral regurgitation. Mild aortic regurgitation. \par \par EKG demonstrates normal sinus rhythm with IVCD\par \par DIMAS demonstrated dilated right-sided chambers with the atrial septal defect and mitral valve disease.\par \par

## 2019-08-10 NOTE — ASSESSMENT
[FreeTextEntry1] : Atrial septal defect. Right-sided enlargement. \par s/p percutaneous closure\par Residual flow\par A followup transesophageal echocardiogram has been recommended\par Discussed indication.\par Discussed the risks, benefits alternatives of procedure. \par All questions answered.\par Defer elective dental work\par Continue aspirin therapy. \par \par Mitral valve disease. \par Monitor valvular heart disease\par \par Hypertension. Reactive component. \par Monitor blood pressure at home. \par \par Hyperlipdemia\par pre DM\par Continue statin therapy.  \par \par \par

## 2019-09-16 ENCOUNTER — MEDICATION RENEWAL (OUTPATIENT)
Age: 59
End: 2019-09-16

## 2019-10-02 ENCOUNTER — OUTPATIENT (OUTPATIENT)
Dept: OUTPATIENT SERVICES | Facility: HOSPITAL | Age: 59
LOS: 1 days | End: 2019-10-02
Payer: COMMERCIAL

## 2019-10-02 DIAGNOSIS — Z96.641 PRESENCE OF RIGHT ARTIFICIAL HIP JOINT: Chronic | ICD-10-CM

## 2019-10-02 PROCEDURE — 93312 ECHO TRANSESOPHAGEAL: CPT | Mod: 26

## 2019-10-02 PROCEDURE — 93320 DOPPLER ECHO COMPLETE: CPT | Mod: 26

## 2019-10-04 ENCOUNTER — APPOINTMENT (OUTPATIENT)
Dept: CARDIOLOGY | Facility: CLINIC | Age: 59
End: 2019-10-04
Payer: COMMERCIAL

## 2019-10-04 VITALS
DIASTOLIC BLOOD PRESSURE: 68 MMHG | OXYGEN SATURATION: 98 % | HEIGHT: 70 IN | SYSTOLIC BLOOD PRESSURE: 110 MMHG | WEIGHT: 185 LBS | BODY MASS INDEX: 26.48 KG/M2 | HEART RATE: 68 BPM

## 2019-10-04 PROCEDURE — 99213 OFFICE O/P EST LOW 20 MIN: CPT

## 2019-10-04 NOTE — PHYSICAL EXAM
[General Appearance - Well Developed] : well developed [Normal Appearance] : normal appearance [Well Groomed] : well groomed [General Appearance - Well Nourished] : well nourished [No Deformities] : no deformities [General Appearance - In No Acute Distress] : no acute distress [Normal Conjunctiva] : the conjunctiva exhibited no abnormalities [Eyelids - No Xanthelasma] : the eyelids demonstrated no xanthelasmas [Normal Oral Mucosa] : normal oral mucosa [No Oral Cyanosis] : no oral cyanosis [No Oral Pallor] : no oral pallor [Normal Jugular Venous A Waves Present] : normal jugular venous A waves present [No Jugular Venous Oates A Waves] : no jugular venous oates A waves [Normal Jugular Venous V Waves Present] : normal jugular venous V waves present [Respiration, Rhythm And Depth] : normal respiratory rhythm and effort [Exaggerated Use Of Accessory Muscles For Inspiration] : no accessory muscle use [Auscultation Breath Sounds / Voice Sounds] : lungs were clear to auscultation bilaterally [Heart Rate And Rhythm] : heart rate and rhythm were normal [Heart Sounds] : normal S1 and S2 [Murmurs] : no murmurs present [Abdomen Tenderness] : non-tender [Abdomen Soft] : soft [Abnormal Walk] : normal gait [Abdomen Mass (___ Cm)] : no abdominal mass palpated [Gait - Sufficient For Exercise Testing] : the gait was sufficient for exercise testing [Cyanosis, Localized] : no localized cyanosis [Nail Clubbing] : no clubbing of the fingernails [Petechial Hemorrhages (___cm)] : no petechial hemorrhages [Skin Color & Pigmentation] : normal skin color and pigmentation [] : no rash [No Venous Stasis] : no venous stasis [Skin Lesions] : no skin lesions [No Skin Ulcers] : no skin ulcer [No Xanthoma] : no  xanthoma was observed

## 2019-10-06 NOTE — ASSESSMENT
[FreeTextEntry1] : Atrial septal defect. \par s/p percutaneous closure\par Reviewed DIMAS\par Follow up echo to monitor RV, PASP etc\par Continue aspirin therapy. \par \par Mitral valve disease. \par Monitor valvular heart disease\par \par Hypertension. Reactive component. \par Blood pressure is well controlled.\par Monitor blood pressure at home. \par \par Hyperlipdemia\par pre DM\par Continue statin therapy. \par \par Followup blood work has been requested.\par

## 2019-10-06 NOTE — REASON FOR VISIT
[Follow-Up - Clinic] : a clinic follow-up of [Abnormal Test Result] : an abnormal test result [Hypertension] : hypertension [Hyperlipidemia] : hyperlipidemia [Medication Management] : Medication management [Mitral Regurgitation] : mitral regurgitation

## 2019-10-06 NOTE — HISTORY OF PRESENT ILLNESS
[FreeTextEntry1] : ZACK MICHAELS  is a 59 year old  M\par h/o ASD s/p closure, HTN, HL, pre DM, MR\par \par There is no prior history of a clinical myocardial infarction, coronary revascularization. \par There is no history of symptomatic congestive heart failure rheumatic heart disease\par There is no history of symptomatic arrhythmias including atrial fibrillation.\par \par Initially referred by Dr. Dixon for an echocardiogram after recently diagnosed with hypertension, hyperlipidemia. \par Started on antihypertensives and statin therapy.\par Echocardiogram demonstrated right-sided enlargement. \par He reports he was previously told of an enlarged heart prior to his hip surgery. \par \par Intracardiac echo demonstrated an ostium secundum ASD with adequate margins for closure\par Right atrium and right ventricle pressures were normal.\par Percutaneous device closure was performed under fluoroscopic guidance with amplatz septal occluder device. \par Post procedure echocardiogram was performed March 2019. Normal left ventricular function, mild RV enlargement\par \par Physically active at baseline. Previously, running 5K. \par Less active after hip replacement. \par Walks actively at his job. \par There is no exertional chest pain, pressure or discomfort. \par There is no significant dyspnea on exertion or orthopnea. \par There are no symptomatic palpitations, lightheadedness, dizziness or syncope.\par \par Transesophageal echocardiogram, atrial septal aneurysm. No intracardiac shunt. Normal Doppler. Well-seated prosthetic device without residual shunting. \par April 2019. Total cholesterol 156, HDL 50, LDL 95, creatinine 1.0, potassium 4.0, hemoglobin 13.1. \par Echocardiogram, April 2019. Normal left ventricular function. Mild to moderate mitral regurgitation. Mild aortic regurgitation. \par EKG demonstrates normal sinus rhythm with IVCD\par Prior DIMAS demonstrated dilated right-sided chambers with the atrial septal defect and mitral valve disease.

## 2019-10-11 ENCOUNTER — APPOINTMENT (OUTPATIENT)
Dept: CARDIOLOGY | Facility: CLINIC | Age: 59
End: 2019-10-11

## 2019-12-17 RX ORDER — LOSARTAN POTASSIUM AND HYDROCHLOROTHIAZIDE 25; 100 MG/1; MG/1
100-25 TABLET ORAL DAILY
Qty: 90 | Refills: 1 | Status: DISCONTINUED | COMMUNITY
Start: 2019-02-19 | End: 2019-12-17

## 2020-06-19 ENCOUNTER — APPOINTMENT (OUTPATIENT)
Dept: CARDIOLOGY | Facility: CLINIC | Age: 60
End: 2020-06-19
Payer: COMMERCIAL

## 2020-06-19 ENCOUNTER — NON-APPOINTMENT (OUTPATIENT)
Age: 60
End: 2020-06-19

## 2020-06-19 VITALS
BODY MASS INDEX: 28.06 KG/M2 | SYSTOLIC BLOOD PRESSURE: 122 MMHG | OXYGEN SATURATION: 96 % | HEIGHT: 70 IN | HEART RATE: 91 BPM | WEIGHT: 196 LBS | DIASTOLIC BLOOD PRESSURE: 68 MMHG

## 2020-06-19 PROCEDURE — 93000 ELECTROCARDIOGRAM COMPLETE: CPT

## 2020-06-19 PROCEDURE — 99214 OFFICE O/P EST MOD 30 MIN: CPT

## 2020-06-19 RX ORDER — ATORVASTATIN CALCIUM 10 MG/1
10 TABLET, FILM COATED ORAL
Qty: 90 | Refills: 0 | Status: DISCONTINUED | COMMUNITY
End: 2020-06-19

## 2020-06-19 NOTE — ASSESSMENT
[FreeTextEntry1] : ZACK MICHAELS is a 60 year old M who presents today Jun 19, 2020 with the above history and the following active issues:\par \par HLD/Elevated CK: Remain off Liptior. Recheck CK with PCP. Then will likely change statin. There are no myalgias.\par \par Atrial septal defect. \par s/p percutaneous closure\par Reviewed DIMAS\par Follow up echo to monitor RV, PASP etc. Obtain 2d echocardiogram to evaluate resting heart structure, valvular function, and LVEF. Obtain abdominal ultrasound to evaluate for aortic aneurysm or significant aortic atherosclerosis. Obtain carotid ultrasound to evaluate for evidence of significant carotid atherosclerosis or obstruction. \par Continue aspirin therapy. \par \par Mitral valve disease. \par Monitor valvular heart disease. Repeat echo as above.\par \par Hypertension. Reactive component. \par Blood pressure is moderately well controlled. 130/80 on my exam. Trend BPs at home. Low salt diet discussed.\par Monitor blood pressure at home.  Continue HCTZ and Losartan.\par \par \par \par Followup blood work has been requested.\par \par F/U after testing to review results.\par Discussed red flag symptoms, which would warrant sooner or emergent medical evaluation.\par Any questions and concerns were addressed and resolved.\par \par Sincerely,\par Zina MARCANO-BC\par Patient's history, testing, and plan was reviewed with supervising physician, Dr. Davide Mejía\par

## 2020-06-19 NOTE — HISTORY OF PRESENT ILLNESS
[FreeTextEntry1] : ZACK MICHAELS  is a 59 year old  M\par h/o ASD s/p closure, HTN, HL, pre DM, MR\par \par There is no prior history of a clinical myocardial infarction, coronary revascularization. \par There is no history of symptomatic congestive heart failure rheumatic heart disease\par There is no history of symptomatic arrhythmias including atrial fibrillation.\par \par Initially referred by Dr. Dixon for an echocardiogram after recently diagnosed with hypertension, hyperlipidemia. \par Started on antihypertensives and statin therapy.\par Echocardiogram demonstrated right-sided enlargement. \par He reports he was previously told of an enlarged heart prior to his hip surgery. \par \par Intracardiac echo demonstrated an ostium secundum ASD with adequate margins for closure\par Right atrium and right ventricle pressures were normal.\par Percutaneous device closure was performed under fluoroscopic guidance with amplatz septal occluder device. \par Post procedure echocardiogram was performed March 2019. Normal left ventricular function, mild RV enlargement\par \par Last seen 10/4/19. In the interim there have been no hospitalizations or procedures. He denies chest pain, pressure, palpitations, unusual shortness of breath, orthopnea, LE edema, lightheadedness, dizziness, near syncope or syncope. Bikes without exertional complaints.\par \par Of note, had b/w with PCP. CK elevated. Lipitor was stopped.\par \par Testing:\par \par Labs 6/9/20: Mag 2.3, Covid negative antibody, IgA, Covid negative IgG, Covid negative IgM, CRP 6.40, PSA 3.1, TSH 1.680, A1C 6.2, , Chol 161, HDL 70, LDL 78, Trigs 64, ALT 27, AST 43, BUN 22, Cr 1.26, K 3.8, Na 141, Hgb 43.4\par \par EKG today 6/19/20: SR at 80 bpm, CO interval 180 ms, QTc 413 ms, LVH, IVCD, nonspecific ST-T wave abnormalities \par Transesophageal echocardiogram, atrial septal aneurysm. No intracardiac shunt. Normal Doppler. Well-seated prosthetic device without residual shunting. \par April 2019. Total cholesterol 156, HDL 50, LDL 95, creatinine 1.0, potassium 4.0, hemoglobin 13.1. \par Echocardiogram, April 2019. Normal left ventricular function. Mild to moderate mitral regurgitation. Mild aortic regurgitation. \par EKG demonstrates normal sinus rhythm with IVCD\par Prior DIMAS demonstrated dilated right-sided chambers with the atrial septal defect and mitral valve disease.

## 2020-06-19 NOTE — REASON FOR VISIT
[Follow-Up - Clinic] : a clinic follow-up of [Abnormal Test Result] : an abnormal test result [Hyperlipidemia] : hyperlipidemia [Hypertension] : hypertension [Medication Management] : Medication management [Mitral Regurgitation] : mitral regurgitation

## 2020-08-17 ENCOUNTER — APPOINTMENT (OUTPATIENT)
Dept: CARDIOLOGY | Facility: CLINIC | Age: 60
End: 2020-08-17
Payer: COMMERCIAL

## 2020-08-17 PROCEDURE — 93979 VASCULAR STUDY: CPT

## 2020-08-17 PROCEDURE — 93880 EXTRACRANIAL BILAT STUDY: CPT

## 2020-08-17 PROCEDURE — 93306 TTE W/DOPPLER COMPLETE: CPT

## 2020-08-28 ENCOUNTER — APPOINTMENT (OUTPATIENT)
Dept: CARDIOLOGY | Facility: CLINIC | Age: 60
End: 2020-08-28
Payer: COMMERCIAL

## 2020-08-28 VITALS
HEART RATE: 82 BPM | WEIGHT: 190 LBS | HEIGHT: 70 IN | SYSTOLIC BLOOD PRESSURE: 122 MMHG | BODY MASS INDEX: 27.2 KG/M2 | OXYGEN SATURATION: 96 % | DIASTOLIC BLOOD PRESSURE: 60 MMHG

## 2020-08-28 PROCEDURE — 99214 OFFICE O/P EST MOD 30 MIN: CPT

## 2020-08-28 NOTE — REASON FOR VISIT
[Follow-Up - Clinic] : a clinic follow-up of [Hyperlipidemia] : hyperlipidemia [Abnormal Test Result] : an abnormal test result [Hypertension] : hypertension [Medication Management] : Medication management [Mitral Regurgitation] : mitral regurgitation

## 2020-08-28 NOTE — PHYSICAL EXAM
[General Appearance - Well Nourished] : well nourished [General Appearance - Well Developed] : well developed [Well Groomed] : well groomed [Normal Appearance] : normal appearance [No Deformities] : no deformities [General Appearance - In No Acute Distress] : no acute distress [Normal Conjunctiva] : the conjunctiva exhibited no abnormalities [Eyelids - No Xanthelasma] : the eyelids demonstrated no xanthelasmas [Normal Oral Mucosa] : normal oral mucosa [No Oral Pallor] : no oral pallor [Normal Jugular Venous A Waves Present] : normal jugular venous A waves present [No Oral Cyanosis] : no oral cyanosis [Normal Jugular Venous V Waves Present] : normal jugular venous V waves present [No Jugular Venous Oates A Waves] : no jugular venous oates A waves [Auscultation Breath Sounds / Voice Sounds] : lungs were clear to auscultation bilaterally [Exaggerated Use Of Accessory Muscles For Inspiration] : no accessory muscle use [Respiration, Rhythm And Depth] : normal respiratory rhythm and effort [Heart Sounds] : normal S1 and S2 [Heart Rate And Rhythm] : heart rate and rhythm were normal [Murmurs] : no murmurs present [Abdomen Tenderness] : non-tender [Abdomen Soft] : soft [Abdomen Mass (___ Cm)] : no abdominal mass palpated [Nail Clubbing] : no clubbing of the fingernails [Gait - Sufficient For Exercise Testing] : the gait was sufficient for exercise testing [Abnormal Walk] : normal gait [Petechial Hemorrhages (___cm)] : no petechial hemorrhages [Cyanosis, Localized] : no localized cyanosis [No Venous Stasis] : no venous stasis [] : no rash [Skin Color & Pigmentation] : normal skin color and pigmentation [Skin Lesions] : no skin lesions [No Xanthoma] : no  xanthoma was observed [No Skin Ulcers] : no skin ulcer

## 2020-08-29 NOTE — ASSESSMENT
[FreeTextEntry1] : Atrial septal defect. \par s/p percutaneous closure\par Continue aspirin therapy. \par \par Mitral valve disease. mild to mod \par Monitor valvular heart disease\par \par Hypertension. Reactive component.\par Borderline Cr \par Blood pressure is well controlled.\par Monitor blood pressure at home. \par \par Hyperlipdemia\par Atherosclerosis.  \par pre DM\par ASCVD risk score intermediate \par Elevated CPK, LFts\par Prior abnormal blood work with low-dose atorvastatin.  \par Trial of lifestyle measures.  \par If cholesterol remains above goal discussed use of low-dose alternative statin therapy

## 2020-08-29 NOTE — HISTORY OF PRESENT ILLNESS
[FreeTextEntry1] : ZACK MICHAELS  is a 60 year old  M\par \par h/o ASD s/p closure, HTN, HL, pre DM, MR\par \par There is no prior history of a clinical myocardial infarction, coronary revascularization. \par There is no history of symptomatic congestive heart failure rheumatic heart disease\par There is no history of symptomatic arrhythmias including atrial fibrillation.\par \par Initially referred by Dr. Dixon for an echocardiogram after recently diagnosed with hypertension, hyperlipidemia. \par Started on antihypertensives and statin therapy.\par Echocardiogram demonstrated right-sided enlargement. \par He reports he was previously told of an enlarged heart prior to his hip surgery. \par \par Intracardiac echo demonstrated an ostium secundum ASD with adequate margins for closure\par Right atrium and right ventricle pressures were normal.\par Percutaneous device closure was performed under fluoroscopic guidance with amplatz septal occluder device. \par \par Physically active at baseline. Previously, running 5K. \par Less active after hip replacement. \par Walks actively at his job. \par There is no exertional chest pain, pressure or discomfort. \par There is no significant dyspnea on exertion or orthopnea. \par There are no symptomatic palpitations, lightheadedness, dizziness or syncope.\par Blood pressure is well controlled.\par Off statin therapy after recent labs\par \par Recent noninvasive testing has been reviewed.  \par June 2020 TSH 1.7 hemoglobin A1c 6.2  AST 27 ALT 43 creatinine 1.3 \par EKG demonstrates normal sinus rhythm \par Follow-up blood work with LDL of 132 CPK of 234 hemoglobin 14.3 creatinine 1.1 LFTs within normal limits a\par \par Abdominal ultrasound August 2020 mild atherosclerosis \par Echocardiogram August 2020 has normal left ventricular function mild to moderate mitral regurgitation mild aortic regurgitation mild tricuspid regurgitation normal pulmonary pressures \par Carotid Doppler has mild nonobstructive disease bilaterally\par \par Transesophageal echocardiogram, atrial septal aneurysm. No intracardiac shunt. Normal Doppler. Well-seated prosthetic device without residual shunting. \par \par Prior DIMAS demonstrated dilated right-sided chambers with the atrial septal defect and mitral valve disease.\par

## 2020-09-14 RX ORDER — LOSARTAN POTASSIUM 100 MG/1
100 TABLET, FILM COATED ORAL DAILY
Qty: 2 | Refills: 0 | Status: DISCONTINUED | COMMUNITY
Start: 1900-01-01 | End: 2020-09-14

## 2020-09-14 RX ORDER — HYDROCHLOROTHIAZIDE 25 MG/1
25 TABLET ORAL DAILY
Qty: 90 | Refills: 0 | Status: DISCONTINUED | COMMUNITY
Start: 1900-01-01 | End: 2020-09-14

## 2021-03-23 ENCOUNTER — APPOINTMENT (OUTPATIENT)
Dept: CARDIOLOGY | Facility: CLINIC | Age: 61
End: 2021-03-23
Payer: COMMERCIAL

## 2021-03-23 VITALS
TEMPERATURE: 98.2 F | BODY MASS INDEX: 28.35 KG/M2 | DIASTOLIC BLOOD PRESSURE: 62 MMHG | HEIGHT: 70 IN | OXYGEN SATURATION: 98 % | HEART RATE: 90 BPM | WEIGHT: 198 LBS | SYSTOLIC BLOOD PRESSURE: 138 MMHG

## 2021-03-23 DIAGNOSIS — R73.03 PREDIABETES.: ICD-10-CM

## 2021-03-23 PROCEDURE — 99072 ADDL SUPL MATRL&STAF TM PHE: CPT

## 2021-03-23 PROCEDURE — 99213 OFFICE O/P EST LOW 20 MIN: CPT

## 2021-03-29 NOTE — ASSESSMENT
[FreeTextEntry1] : \par Atrial septal defect. \par s/p percutaneous closure\par Continue aspirin therapy. \par \par Mitral valve disease. mild to mod \par Monitor valvular heart disease\par \par Hypertension. Reactive component.\par Blood pressure is well controlled.\par Monitor blood pressure at home. \par \par Hyperlipdemia\par Atherosclerosis.  \par pre DM\par ASCVD risk score intermediate \par Elevated CPK, LFts\par Prior abnormal blood work with low-dose atorvastatin.  \par \par recent labs have been reviewed \par start low-dose Crestor instructed to call if adverse effect \par follow-up blood work including LFT and CPK\par follow-up echocardiogram

## 2021-03-29 NOTE — HISTORY OF PRESENT ILLNESS
[FreeTextEntry1] : ZACK MICHAELS  is a 61 year old  M\par \par h/o ASD s/p closure, HTN, HL, pre DM, MR\par \par There is no prior history of a clinical myocardial infarction, coronary revascularization. \par There is no history of symptomatic congestive heart failure rheumatic heart disease\par There is no history of symptomatic arrhythmias including atrial fibrillation.\par \par Initially referred by Dr. Dixon for an echocardiogram after recently diagnosed with hypertension, hyperlipidemia. \par Started on antihypertensives and statin therapy.\par Echocardiogram demonstrated right-sided enlargement. \par He reports he was previously told of an enlarged heart prior to his hip surgery. \par \par Dx with ASD\par Right atrium and right ventricle pressures were normal.\par Percutaneous device closure was performed under fluoroscopic guidance with amplatz septal occluder device. \par \par Physically active at baseline. \par Less active after hip replacement. \par Walks actively at his job. \par There is no exertional chest pain, pressure or discomfort. \par There is no significant dyspnea on exertion or orthopnea. \par There are no symptomatic palpitations, lightheadedness, dizziness or syncope.\par Blood pressure is well controlled.\par \par EKG demonstrates normal sinus rhythm \par Blood work December 2020 hemoglobin A1c 6.3  creatinine 1.1 potassium 4.2 hemoglobin 14.5\par Abdominal ultrasound August 2020 mild atherosclerosis \par Echocardiogram August 2020 has normal left ventricular function mild to moderate mitral regurgitation mild aortic regurgitation mild tricuspid regurgitation normal pulmonary pressures \par Carotid Doppler has mild nonobstructive disease bilaterally\par Transesophageal echocardiogram, atrial septal aneurysm. No intracardiac shunt. Normal Doppler. Well-seated prosthetic device without residual shunting. \par Prior DIMAS demonstrated dilated right-sided chambers with the atrial septal defect and mitral valve disease.\par

## 2021-09-24 ENCOUNTER — NON-APPOINTMENT (OUTPATIENT)
Age: 61
End: 2021-09-24

## 2021-09-24 ENCOUNTER — APPOINTMENT (OUTPATIENT)
Dept: CARDIOLOGY | Facility: CLINIC | Age: 61
End: 2021-09-24
Payer: COMMERCIAL

## 2021-09-24 VITALS
WEIGHT: 189 LBS | HEIGHT: 70 IN | DIASTOLIC BLOOD PRESSURE: 78 MMHG | HEART RATE: 85 BPM | TEMPERATURE: 98 F | OXYGEN SATURATION: 99 % | SYSTOLIC BLOOD PRESSURE: 136 MMHG | BODY MASS INDEX: 27.06 KG/M2

## 2021-09-24 DIAGNOSIS — R94.31 ABNORMAL ELECTROCARDIOGRAM [ECG] [EKG]: ICD-10-CM

## 2021-09-24 PROCEDURE — 99214 OFFICE O/P EST MOD 30 MIN: CPT

## 2021-09-24 PROCEDURE — 93000 ELECTROCARDIOGRAM COMPLETE: CPT

## 2021-09-24 RX ORDER — ROSUVASTATIN CALCIUM 5 MG/1
5 TABLET, FILM COATED ORAL DAILY
Qty: 90 | Refills: 3 | Status: DISCONTINUED | COMMUNITY
Start: 2021-03-23 | End: 2021-09-24

## 2021-09-26 NOTE — ASSESSMENT
[FreeTextEntry1] : \par recent outside blood work and EKG have been reviewed \par follow-up echocardiogram and stress echocardiogram \par abnormal EKG elevated CPK borderline hyperlipidemia prediabetes\par he is motivated to improve his cardiovascular risk profile with lifestyle measures\par follow-up blood work will be performed \par clinical follow-up will be scheduled after the echocardiogram and stress echocardiogram \par may possibly rechallenge with alternative statin therapy based on blood work results \par \par Atrial septal defect. \par s/p percutaneous closure\par Continue aspirin therapy. \par \par Mitral valve disease. mild to mod \par Monitor valvular heart disease\par \par Hypertension. Reactive component.\par Blood pressure is well controlled.\par Monitor blood pressure at home. \par \par Hyperlipdemia\par Atherosclerosis.  \par pre DM\par ASCVD risk score intermediate \par Elevated CPK, LFts\par Prior abnormal blood work with low-dose atorvastatin, crestor.  \par recent labs have been reviewed \par follow-up blood work including LFT and CPK

## 2021-09-26 NOTE — HISTORY OF PRESENT ILLNESS
[FreeTextEntry1] : ZACK MICHAELS  is a 61 year old  M\par h/o ASD s/p closure, HTN, HL, pre DM, MR\par \par There is no prior history of a clinical myocardial infarction, coronary revascularization. \par There is no history of symptomatic congestive heart failure rheumatic heart disease\par There is no history of symptomatic arrhythmias including atrial fibrillation.\par \par Initially referred by Dr. Dixon for an echocardiogram after recently diagnosed with hypertension, hyperlipidemia. \par Started on antihypertensives and statin therapy.\par Echocardiogram demonstrated right-sided enlargement. \par He reports he was previously told of an enlarged heart prior to his hip surgery. \par \par Dx with ASD\par Right atrium and right ventricle pressures were normal.\par Percutaneous device closure was performed under fluoroscopic guidance with amplatz septal occluder device. \par \par Physically active at baseline. \par Blood pressure is well controlled.\par now more motivated with lifestyle measures \par he saw his primary physician who stopped the statin therapy due to elevated CPK \par he walks up to 3 miles \par already lost weight\par \par There is no exertional chest pain, pressure or discomfort. \par There is no significant dyspnea on exertion or orthopnea. \par There are no symptomatic palpitations, lightheadedness, dizziness or syncope.\par \par EKG today demonstrates normal sinus rhythm with interventricular conduction delay \par blood work performed TSH 2.2 hemoglobin A1c 6.5 total cholesterol 183   hemoglobin 14.6 AST 28 ALT 37 creatinine 1.2 potassium 4.0 \par Abdominal ultrasound August 2020 mild atherosclerosis \par Echocardiogram August 2020 has normal left ventricular function mild to moderate mitral regurgitation mild aortic regurgitation mild tricuspid regurgitation normal pulmonary pressures \par Carotid Doppler has mild nonobstructive disease bilaterally\par Transesophageal echocardiogram, atrial septal aneurysm. No intracardiac shunt. Normal Doppler. Well-seated prosthetic device without residual shunting. \par Prior DIMAS demonstrated dilated right-sided chambers with the atrial septal defect and mitral valve disease.\par

## 2021-11-22 ENCOUNTER — APPOINTMENT (OUTPATIENT)
Dept: CARDIOLOGY | Facility: CLINIC | Age: 61
End: 2021-11-22
Payer: COMMERCIAL

## 2021-11-22 PROCEDURE — 93306 TTE W/DOPPLER COMPLETE: CPT

## 2022-01-05 ENCOUNTER — APPOINTMENT (OUTPATIENT)
Dept: CARDIOLOGY | Facility: CLINIC | Age: 62
End: 2022-01-05
Payer: COMMERCIAL

## 2022-01-05 ENCOUNTER — APPOINTMENT (OUTPATIENT)
Dept: CARDIOLOGY | Facility: CLINIC | Age: 62
End: 2022-01-05

## 2022-01-05 PROCEDURE — 78452 HT MUSCLE IMAGE SPECT MULT: CPT

## 2022-01-05 PROCEDURE — A9502: CPT

## 2022-01-05 PROCEDURE — 93015 CV STRESS TEST SUPVJ I&R: CPT

## 2022-02-04 ENCOUNTER — APPOINTMENT (OUTPATIENT)
Dept: CARDIOLOGY | Facility: CLINIC | Age: 62
End: 2022-02-04
Payer: COMMERCIAL

## 2022-02-04 VITALS
BODY MASS INDEX: 27.2 KG/M2 | TEMPERATURE: 96.9 F | DIASTOLIC BLOOD PRESSURE: 76 MMHG | SYSTOLIC BLOOD PRESSURE: 130 MMHG | HEART RATE: 74 BPM | OXYGEN SATURATION: 97 % | HEIGHT: 70 IN | WEIGHT: 190 LBS

## 2022-02-04 DIAGNOSIS — Q21.1 ATRIAL SEPTAL DEFECT: ICD-10-CM

## 2022-02-04 DIAGNOSIS — E78.2 MIXED HYPERLIPIDEMIA: ICD-10-CM

## 2022-02-04 DIAGNOSIS — I34.0 NONRHEUMATIC MITRAL (VALVE) INSUFFICIENCY: ICD-10-CM

## 2022-02-04 DIAGNOSIS — I10 ESSENTIAL (PRIMARY) HYPERTENSION: ICD-10-CM

## 2022-02-04 PROCEDURE — 99214 OFFICE O/P EST MOD 30 MIN: CPT

## 2022-02-12 NOTE — ASSESSMENT
[FreeTextEntry1] : Echocardiogram has been reviewed.  Mild to moderate mitral vegetation, mild aortic regurgitation, normal left ventricular function.  No right-sided enlargement.  No pulmonary hypertension.  \par No significant ischemic heart disease. \par emains physically active without exertional symptoms.\par Outside blood work has been requested.  \par We will follow-up blood work after trial of lifestyle measures.  \par There is prior intolerance to atorvastatin rosuvastatin. \par Discussed preventine lipid-lowering therapy with alternative statin versus injectable lipid-lowering therapy.  \par \par abnormal EKG elevated CPK borderline hyperlipidemia prediabetes\par motivated to improve his cardiovascular risk profile with lifestyle measures\par follow-up blood work will be performed \par \par Atrial septal defect. \par s/p percutaneous closure\par Continue aspirin therapy. \par \par Mitral valve disease. mild to mod \par Monitor valvular heart disease\par \par Hypertension. Reactive component.\par Blood pressure is well controlled.\par Monitor blood pressure at home. \par \par Hyperlipdemia\par Atherosclerosis.  \par pre DM\par ASCVD risk score intermediate \par Elevated CPK, LFts\par Prior abnormal blood work with low-dose atorvastatin, crestor.  \par recent labs have been reviewed \par follow-up blood work including LFT and CPK\par

## 2022-02-12 NOTE — HISTORY OF PRESENT ILLNESS
[FreeTextEntry1] : ZACK MICHAELS  is a 62 year old  M\par \par h/o ASD s/p closure, HTN, HL, pre DM, MR\par \par There is no prior history of a clinical myocardial infarction, coronary revascularization. \par There is no history of symptomatic congestive heart failure rheumatic heart disease\par There is no history of symptomatic arrhythmias including atrial fibrillation.\par \par Initially referred by Dr. Dixon for an echocardiogram after recently diagnosed with hypertension, hyperlipidemia. \par Started on antihypertensives and statin therapy.\par Echocardiogram demonstrated right-sided enlargement. \par He reports he was previously told of an enlarged heart prior to his hip surgery. \par \par Dx with ASD\par Right atrium and right ventricle pressures were normal.\par Percutaneous device closure was performed under fluoroscopic guidance with amplatz septal occluder device. \par \par Physically active at baseline. \par Blood pressure is well controlled.\par now more motivated with lifestyle measures \par he saw his primary physician who stopped the statin therapy due to elevated CPK \par he walks up to 3 miles \par already lost weight\par \par There is no exertional chest pain, pressure or discomfort. \par There is no significant dyspnea on exertion or orthopnea. \par There are no symptomatic palpitations, lightheadedness, dizziness or syncope.\par \par Today he presents to review recent cardiovascular testing.  He remains off lipid-lowering therapy.  He recently adjusted his diet.  \par \par Nuclear stress test January 2022 has normal myocardial perfusion. \par echocardiogram from November 2021 has normal left ventricular function, mild to moderate mitral regurgitation, mild aortic regurgitation right-sided chamber sizes are normal and there is no pulmonary hypertension.  \par \par EKG demonstrates normal sinus rhythm with interventricular conduction delay \par blood work performed TSH 2.2 hemoglobin A1c 6.5 total cholesterol 183   hemoglobin 14.6 AST 28 ALT 37 creatinine 1.2 potassium 4.0 \par Abdominal ultrasound August 2020 mild atherosclerosis \par Carotid Doppler has mild nonobstructive disease bilaterally\par Transesophageal echocardiogram, atrial septal aneurysm. No intracardiac shunt. Normal Doppler. Well-seated prosthetic device without residual shunting. \par Prior DIMAS demonstrated dilated right-sided chambers with the atrial septal defect and mitral valve disease.\par \par \par

## 2022-03-04 RX ORDER — LOSARTAN POTASSIUM AND HYDROCHLOROTHIAZIDE 25; 100 MG/1; MG/1
100-25 TABLET ORAL DAILY
Qty: 90 | Refills: 3 | Status: ACTIVE | COMMUNITY
Start: 2020-09-14 | End: 1900-01-01

## 2025-01-22 NOTE — DISCHARGE NOTE NURSING/CASE MANAGEMENT/SOCIAL WORK - NSFLUVACAGEDISCH_IMM_ALL_CORE
Requested Prescriptions     Pending Prescriptions Disp Refills    PRODIGY NO CODING BLOOD GLUC strip [Pharmacy Med Name: PRODIGY NO CODING TEST STRIPS] 200 strip 3     Sig: USE TWICE DAILY AS NEEDED     Last refilled: 5/25/2023 # 200 with 3 refills     Lov:10/22/2024  Nov:2/28/2025   Adult

## 2025-06-11 ENCOUNTER — OFFICE (OUTPATIENT)
Dept: URBAN - METROPOLITAN AREA CLINIC 97 | Facility: CLINIC | Age: 65
Setting detail: OPHTHALMOLOGY
End: 2025-06-11
Payer: COMMERCIAL

## 2025-06-11 DIAGNOSIS — H35.54: ICD-10-CM

## 2025-06-11 DIAGNOSIS — D31.31: ICD-10-CM

## 2025-06-11 DIAGNOSIS — H25.13: ICD-10-CM

## 2025-06-11 DIAGNOSIS — H43.811: ICD-10-CM

## 2025-06-11 DIAGNOSIS — H43.391: ICD-10-CM

## 2025-06-11 PROCEDURE — 99203 OFFICE O/P NEW LOW 30 MIN: CPT | Performed by: OPTOMETRIST

## 2025-06-11 PROCEDURE — 92250 FUNDUS PHOTOGRAPHY W/I&R: CPT | Performed by: OPTOMETRIST

## 2025-06-11 ASSESSMENT — REFRACTION_AUTOREFRACTION
OD_AXIS: 058
OS_AXIS: 155
OS_CYLINDER: +0.75
OS_SPHERE: -3.25
OD_SPHERE: -3.50
OD_CYLINDER: +1.25

## 2025-06-11 ASSESSMENT — KERATOMETRY
OS_K1POWER_DIOPTERS: 41.75
OD_K2POWER_DIOPTERS: 42.50
METHOD_AUTO_MANUAL: AUTO
OD_AXISANGLE_DEGREES: 096
OD_K1POWER_DIOPTERS: 41.75
OS_AXISANGLE_DEGREES: 114
OS_K2POWER_DIOPTERS: 42.50

## 2025-06-11 ASSESSMENT — REFRACTION_CURRENTRX
OS_VPRISM_DIRECTION: PROGS
OD_CYLINDER: +0.75
OD_ADD: +2.75
OD_AXIS: 156
OS_SPHERE: -3.75
OD_VPRISM_DIRECTION: PROGS
OD_OVR_VA: 20/
OS_AXIS: 161
OS_OVR_VA: 20/
OS_CYLINDER: +0.75
OS_ADD: +2.75
OD_SPHERE: -4.00

## 2025-06-11 ASSESSMENT — VISUAL ACUITY
OS_BCVA: 20/25+2
OD_BCVA: 20/20+2

## 2025-06-11 ASSESSMENT — CONFRONTATIONAL VISUAL FIELD TEST (CVF)
OD_FINDINGS: FULL
OS_FINDINGS: FULL

## 2025-07-01 ENCOUNTER — OFFICE (OUTPATIENT)
Dept: URBAN - METROPOLITAN AREA CLINIC 38 | Facility: CLINIC | Age: 65
Setting detail: OPHTHALMOLOGY
End: 2025-07-01
Payer: COMMERCIAL

## 2025-07-01 DIAGNOSIS — H43.391: ICD-10-CM

## 2025-07-01 DIAGNOSIS — H35.54: ICD-10-CM

## 2025-07-01 DIAGNOSIS — H43.811: ICD-10-CM

## 2025-07-01 PROBLEM — H25.12 CATARACT SENILE NUCLEAR SCLEROSIS;  , LEFT EYE: Status: ACTIVE | Noted: 2025-07-01

## 2025-07-01 PROCEDURE — 92012 INTRM OPH EXAM EST PATIENT: CPT | Performed by: OPHTHALMOLOGY

## 2025-07-01 PROCEDURE — 92134 CPTRZ OPH DX IMG PST SGM RTA: CPT | Performed by: OPHTHALMOLOGY

## 2025-07-01 ASSESSMENT — KERATOMETRY
OS_K1POWER_DIOPTERS: 41.75
OS_AXISANGLE_DEGREES: 114
OD_AXISANGLE_DEGREES: 096
OD_K2POWER_DIOPTERS: 42.50
OD_K1POWER_DIOPTERS: 41.75
METHOD_AUTO_MANUAL: AUTO
OS_K2POWER_DIOPTERS: 42.50

## 2025-07-01 ASSESSMENT — REFRACTION_AUTOREFRACTION
OD_CYLINDER: +1.25
OD_SPHERE: -3.50
OD_AXIS: 058
OS_CYLINDER: +0.75
OS_SPHERE: -3.25
OS_AXIS: 155

## 2025-07-01 ASSESSMENT — VISUAL ACUITY
OD_BCVA: 20/25
OS_BCVA: 20/20-1

## 2025-07-01 ASSESSMENT — CONFRONTATIONAL VISUAL FIELD TEST (CVF)
OD_FINDINGS: FULL
OS_FINDINGS: FULL

## 2025-08-05 ENCOUNTER — OFFICE (OUTPATIENT)
Dept: URBAN - METROPOLITAN AREA CLINIC 38 | Facility: CLINIC | Age: 65
Setting detail: OPHTHALMOLOGY
End: 2025-08-05
Payer: COMMERCIAL

## 2025-08-05 DIAGNOSIS — H43.391: ICD-10-CM

## 2025-08-05 DIAGNOSIS — H43.811: ICD-10-CM

## 2025-08-05 DIAGNOSIS — H35.54: ICD-10-CM

## 2025-08-05 PROCEDURE — 92134 CPTRZ OPH DX IMG PST SGM RTA: CPT | Performed by: OPHTHALMOLOGY

## 2025-08-05 PROCEDURE — 92012 INTRM OPH EXAM EST PATIENT: CPT | Performed by: OPHTHALMOLOGY

## 2025-08-05 ASSESSMENT — REFRACTION_AUTOREFRACTION
OD_SPHERE: -3.50
OS_SPHERE: -3.25
OD_CYLINDER: +1.25
OD_AXIS: 058
OS_AXIS: 155
OS_CYLINDER: +0.75

## 2025-08-05 ASSESSMENT — CONFRONTATIONAL VISUAL FIELD TEST (CVF)
OD_FINDINGS: FULL
OS_FINDINGS: FULL

## 2025-08-05 ASSESSMENT — KERATOMETRY
METHOD_AUTO_MANUAL: AUTO
OS_AXISANGLE_DEGREES: 114
OS_K1POWER_DIOPTERS: 41.75
OS_K2POWER_DIOPTERS: 42.50
OD_AXISANGLE_DEGREES: 096
OD_K1POWER_DIOPTERS: 41.75
OD_K2POWER_DIOPTERS: 42.50

## 2025-08-05 ASSESSMENT — VISUAL ACUITY
OD_BCVA: 20/25+2
OS_BCVA: 20/25